# Patient Record
Sex: FEMALE | Race: WHITE | Employment: UNEMPLOYED | URBAN - METROPOLITAN AREA
[De-identification: names, ages, dates, MRNs, and addresses within clinical notes are randomized per-mention and may not be internally consistent; named-entity substitution may affect disease eponyms.]

---

## 2020-02-14 ENCOUNTER — HOSPITAL ENCOUNTER (OUTPATIENT)
Dept: PHYSICAL THERAPY | Age: 35
Setting detail: THERAPIES SERIES
Discharge: HOME | End: 2020-02-14
Payer: COMMERCIAL

## 2020-02-14 PROCEDURE — 97112 NEUROMUSCULAR REEDUCATION: CPT

## 2020-02-14 PROCEDURE — 97140 MANUAL THERAPY 1/> REGIONS: CPT

## 2020-02-14 PROCEDURE — 97110 THERAPEUTIC EXERCISES: CPT

## 2020-02-14 PROCEDURE — 97161 PT EVAL LOW COMPLEX 20 MIN: CPT

## 2020-02-14 ASSESSMENT — PAIN DESCRIPTION - FREQUENCY: FREQUENCY: CONTINUOUS

## 2020-02-14 ASSESSMENT — PAIN SCALES - GENERAL: PAINLEVEL_OUTOF10: 1

## 2020-02-14 ASSESSMENT — PAIN DESCRIPTION - PAIN TYPE: TYPE: ACUTE PAIN

## 2020-02-14 ASSESSMENT — PAIN DESCRIPTION - LOCATION: LOCATION: PERINEUM

## 2020-02-14 NOTE — PROGRESS NOTES
[address] [address], [address] [place], [place] [address]  Phone: [phone]   Fax: [phone]                                                       Physical Therapy Certification    Dear Referring Practitioner: Dr. Cece Mcclure,    We had the pleasure of evaluating the following patient for physical therapy services at [address] [place] [address]. A summary of our findings can be found in the initial assessment below. This includes our plan of care. If you have any questions or concerns regarding these findings, please do not hesitate to contact me at the office phone number checked above. Thank you for the referral.       Physician Signature:_______________________________Date:__________________  By signing above (or electronic signature), therapists plan is approved by physician      Physical Therapy  Initial Assessment  Date: 2020  Patient Name: Diandra Dominguez  MRN: [id number]  : 1985     Treatment Diagnosis: decreased PF strength and coordination, high resting tone in PF, sacral and pelvic malalignment, adductor tightness    Restrictions       Subjective   General  Chart Reviewed: Yes  Family / Caregiver Present: No  Referring Practitioner: Dr. Cece Mcclure  Referral Date : 20  Diagnosis: levator ani spasm  General Comment  Comments: Also has low back pain most of the time. PT Visit Information  Onset Date: 19  PT Insurance Information: [employer] (60 v per year)  Subjective  Subjective: Pt reports she does not have incontinence but does have stool urgency but it's due to her medications/new developments in GI tract. She has been more diligent with going to bathroom. Has been having GI problems since . Bladder joined in at August. Has bloating and pain with urination. MDs think GI and painful urination are related.  May have a tension disease (MALS) but is Rotation: 5/5  L Knee Flexion: 5/5  L Knee Extension: 5/5  Strength Other  Other: pt able to complete sit up without UE support         Ortho Screen  Posture: good   Pelvic Alignment: R outflare  Muscle Spasm: none  Ortho Screen: extremely tight adductors B  Abdominals  Scarring: none   Diatasis: none  Functional Stability: good  Abdominals: strong  Pelvic Floor  Introitus: normal   Perineal Descent: minimal  Skin Condition: good  Excursion: minimal  External Clock: normal, nonpainful   Scarring: none  Prolapse Test: n/a  Internal Clock: TTP at 9 o'clock and 2 o'clock  Sensation: diminished after vaginal cancal - could not feel OI release   Vaginal Vault: normal, nonpainful  Muscle Bulk: normal  Muscle Power: 2  Muscle Endurance (Seconds): 4 Seconds  Number Reps to Fatigue: 1  Muscle Flicks: 6  Quality of Contraction: difficult for patient to differential abdominal squeeze and PF contraction, poor coordination   Coordination: poor, myra when she was tryingt o relax and difficulty with relaxation actively  Pelvic Floor External Observations  Voluntary Contraction: Present  Involuntary Contraction: Present  Perineal Descent: Rest: Absent  Perineal Descent Bearing Down: Absent                   Assessment   Conditions Requiring Skilled Therapeutic Intervention  Body structures, Functions, Activity limitations: Decreased functional mobility ; Decreased strength;Decreased endurance;Decreased sensation;Decreased coordination; Increased pain  Assessment: Pt is a 37 y/o female who complains of pelvic pain and tightness. She has hx of narcolepsy and GI issues. She demos increased tension in PF resting tone, however is weak and uncoordinated with contraction. Pt also demos tightness in adductors B and is unabel to separate PF muscles from abdominals. She also presented with sacral and pelvic malalignment.  Pt would benefit from skilled therapy to address deficits, imrpove baldder function, reduce pain and increase strength in pelvic floor. Treatment Diagnosis: decreased PF strength and coordination, high resting tone in PF, sacral and pelvic malalignment, adductor tightness  Prognosis: Good  Decision Making: Low Complexity  REQUIRES PT FOLLOW UP: Yes         Plan   Plan  Times per week: 1x/week for 6 weeks  Current Treatment Recommendations: Strengthening, ROM, Functional Mobility Training, Manual Therapy - Soft Tissue Mobilization, Neuromuscular Re-education, Manual Therapy - Joint Manipulation    OutComes Score     POPDI-6 Score : 25 (02/14/20 1646)   CRADI-8 Score : 18.75 (02/14/20 1646) [place]-6 Score : 41.67 (02/14/20 1646)   PFDI-20 Score: 85.42 (02/14/20 1646)           Goals  Long term goals  Time Frame for Long term goals : 6 weeks  Long term goal 1: Pt will be I with HEP to improve activation and control of pelvic floor. Long term goal 2: Pt will demo ability to contract, relax, and buldge to demo improved coordination of PF muscles. Long term goal 3: Pt will increase PERF powere score to at least 4 to demo imrpoved strength in order to help control bladder and bowels. Long term goal 4: Pt will demo ability to perform functional movements with proper contraction of PF muscles.         Cristina Dean, PT, DPT [id number]

## 2020-02-14 NOTE — FLOWSHEET NOTE
Pt. Education:  2/14: Issued HO and discussed bladder irritants, stressed importance of yoga or meditation to relax, encouraged to to set an alarm on her phone to remind her to relax her muscles throughout the day, patient educated on diagnosis, prognosis and expectations for rehab, all patient questions were answered    HEP instruction:  2/14: patient provided with written and illustrated instructions for HEP (see scanned image in ) - adductor stretch, hip abd with band, PF contract with relax     Therapeutic Exercise and NMR EXR  [x] (37383) Provided verbal/tactile cueing for activities related to strengthening, flexibility, endurance, ROM for improvements in  [x] LE / Lumbar: LE, proximal hip, and core control with self care, mobility, lifting, ambulation, pelvic floor  [] UE / Cervical: cervical, postural, scapular, scapulothoracic and UE control with self care, reaching, carrying, lifting, house/yardwork, driving, computer work. [x] (96873) Provided verbal/tactile cueing for activities related to improving balance, coordination, kinesthetic sense, posture, motor skill, proprioception to assist with   [x] LE / lumbar: LE, proximal hip, and core control in self care, mobility, lifting, ambulation and eccentric single leg control, pelvic floor   [] UE / cervical: cervical, scapular, scapulothoracic and UE control with self care, reaching, carrying, lifting, house/yardwork, driving, computer work.   [] (64260) Therapist is in constant attendance of 2 or more patients providing skilled therapy interventions, but not providing any significant amount of measurable one-on-one time to either patient, for improvements in  [] LE / lumbar: LE, proximal hip, and core control in self care, mobility, lifting, ambulation and eccentric single leg control.    [] UE / cervical: cervical, scapular, scapulothoracic and UE control with self care, reaching, carrying, lifting, house/yardwork, carrying, lifting, house/yardwork, driving, computer work    Manual Treatments:  PROM / STM / Oscillations-Mobs:  G-I, II, III, IV (PA's, Inf., Post.)  [x] (58140) Provided manual therapy to mobilize LE, proximal hip and/or LS spine soft tissue/joints for the purpose of modulating pain, promoting relaxation,  increasing ROM, reducing/eliminating soft tissue swelling/inflammation/restriction, improving soft tissue extensibility and allowing for proper ROM for normal function with   [x] LE / lumbar: self care, mobility, lifting and ambulation, pelvic floor    [] UE / Cervical: self care, reaching, carrying, lifting, house/yardwork, driving, computer work. Modalities:  [] (29543) Vasopneumatic compression: Utilized vasopneumatic compression to decrease edema / swelling for the purpose of improving mobility and quad tone / recruitment which will allow for increased overall function including but not limited to self-care, transfers, ambulation, and ascending / descending stairs. Modalities:      Charges:  Timed Code Treatment Minutes: 70   Total Treatment Minutes: 85     [x] EVAL - LOW (46624)   [] EVAL - MOD (82136)  [] EVAL - HIGH (15830)  [] RE-EVAL (22799)  [x] YD(52363) x  2     [] Ionto  [x] NMR (37681) x 1      [] Vaso  [x] Manual (39479) x 1      [] Ultrasound  [] TA x        [] Mech Traction (67509)  [] Aquatic Therapy x     [] ES (un) (28509):   [] Home Management Training x  [] ES(attended) (74267)   [] Dry Needling 1-2 muscles (15834):  [] Dry Needling 3+ muscles (937840  [] Group:      [] Other:     GOALS:   Long term goals  Time Frame for Long term goals : 6 weeks  Long term goal 1: Pt will be I with HEP to improve activation and control of pelvic floor. Long term goal 2: Pt will demo ability to contract, relax, and buldge to demo improved coordination of PF muscles.    Long term goal 3: Pt will increase PERF powere score to at least 4 to demo imrpoved strength in order to help control bladder

## 2020-03-02 ENCOUNTER — HOSPITAL ENCOUNTER (OUTPATIENT)
Dept: PHYSICAL THERAPY | Age: 35
Setting detail: THERAPIES SERIES
Discharge: HOME | End: 2020-03-02
Payer: COMMERCIAL

## 2020-03-02 NOTE — PROGRESS NOTES
Physical Therapy  Cancellation/No-show Note  Patient Name:  Eulogio Scruggs  :  1985   Date:  3/2/2020  Cancelled visits to date: 1  No-shows to date: 0    Patient status for today's appointment patient:  [x]  Cancelled 3/2  []  Rescheduled appointment  []  No-show     Reason given by patient:  []  Patient ill  []  Conflicting appointment  []  No transportation    []  Conflict with work  []  No reason given  [x]  Other:  Patient overselpt   Comments:      Phone call information:   []  Phone call made today to patient at _ time at number provided:      []  Patient answered, conversation as follows:    []  Patient did not answer, message left as follows:  [x]  Phone call not made today    Electronically signed by:  Roetta Jeans, PT, DPT

## 2020-03-09 ENCOUNTER — HOSPITAL ENCOUNTER (OUTPATIENT)
Dept: PHYSICAL THERAPY | Age: 35
Setting detail: THERAPIES SERIES
Discharge: HOME | End: 2020-03-09
Payer: COMMERCIAL

## 2020-03-09 PROCEDURE — 97140 MANUAL THERAPY 1/> REGIONS: CPT

## 2020-03-09 PROCEDURE — 97112 NEUROMUSCULAR REEDUCATION: CPT

## 2020-03-09 PROCEDURE — 97110 THERAPEUTIC EXERCISES: CPT

## 2020-03-09 NOTE — FLOWSHEET NOTE
[facility] Physical Therapy  Phone: [phone]   Fax: [phone]    Physical Therapy Daily Treatment Note  Date:  3/9/2020    Patient Name:  Kacey Torres    :  1985  MRN: [id number]  Medical/Treatment Diagnosis Information:  Diagnosis: levator ani spasm  Treatment Diagnosis: decreased PF strength and coordination, high resting tone in PF, sacral and pelvic malalignment, adductor tightness  Insurance/Certification information:  PT Insurance Information: [employer] (60 v per year)  Physician Information:  Referring Practitioner: Dr. Geovanni Carrasco of care signed (Y/N): []  Yes [x]  No Sent 2020    Date of Patient follow up with Physician: ?     Progress Report: []  Yes  [x]  No     Date Range for reporting period:  Beginning 2020  Ending    Progress report due (10 Rx/or 30 days whichever is less): 6th visit    Recertification due (POC duration/ or 90 days whichever is less): 6 weeks     Visit # Insurance Allowable Auth required? Date Range    60 []  Yes  [x]  No n/a     Latex Allergy:  [x]NO      []YES  Preferred Language for Healthcare:   [x]English       []other:      Functional Scale: POPDI-6 Score : 25    CRADI-8 Score : 18.75  [place]-6 Score : 41.67   PFDI-20 Score: 85.42    Pain level:  2-3/10     SUBJECTIVE:   Pt reports she didn't do her HEP over the last week. Has fewer apt this week so she will be able to focus on HEP this week. Did practice pelvic floor contraction and relaxing over with a partner. Having pain today across her abdomen. Has increased pain with holding urine in her bladder but it is getting better. Pain also flares up with her GI issues. Prudencio set it off last weekend. The specialist she needs to see is in [place] so she is waiting to get in with him. Just heard about a MD at [facility] that has done the surgery before.      OBJECTIVE:       RESTRICTIONS/PRECAUTIONS: narcolepsy    Exercises/Interventions: Therapeutic Exercises (58535) Resistance / level Sets/sec Reps Notes   Bridge   Bridge with tband hip abd   Orange   x10   x10    90/90 toe taps    x10 B    Adductor stretch with SOS  20 sec x2 B     Butterfly stretch   1 min  x3                                       Therapeutic Activities (60625)                                          Neuromuscular Re-ed (82703)       Biofeedback PF contract/relax 5 sec holds with full relax   x10 Supine with bolster, VCs for breathing with contraction    Biofeedback relaxation to 4 or less   X 2 mins Able to maintain 2-3, supine with bolster    Biofeedback quick flicks  With full relaxation between  2 x10 Supine, with bolster                         Manual Intervention (40947)       Assessed pelvic alignment, MET to correct R outflare and LOL sacral rot, adductor release B, internal OI release B       Assessed pelvic alignment, MET to correct L post/R ant innominates, MET to correct L inflare, MET to correct LOL sacral rot , DTM to B adductors at origin    X 23 mins                                    Pt. Education:  2/14: Issued HO and discussed bladder irritants, stressed importance of yoga or meditation to relax, encouraged to to set an alarm on her phone to remind her to relax her muscles throughout the day, patient educated on diagnosis, prognosis and expectations for rehab, all patient questions were answered    HEP instruction:  2/14: patient provided with written and illustrated instructions for HEP (see scanned image in ) - adductor stretch, hip abd with band, PF contract with relax     Therapeutic Exercise and NMR EXR  [x] (01386) Provided verbal/tactile cueing for activities related to strengthening, flexibility, endurance, ROM for improvements in  [x] LE / Lumbar: LE, proximal hip, and core control with self care, mobility, lifting, ambulation, pelvic floor  [] UE / Cervical: cervical, postural, scapular, scapulothoracic and UE control with self care, reaching, carrying, lifting, house/yardwork, driving, computer work. [x] (40117) Provided verbal/tactile cueing for activities related to improving balance, coordination, kinesthetic sense, posture, motor skill, proprioception to assist with   [x] LE / lumbar: LE, proximal hip, and core control in self care, mobility, lifting, ambulation and eccentric single leg control, pelvic floor   [] UE / cervical: cervical, scapular, scapulothoracic and UE control with self care, reaching, carrying, lifting, house/yardwork, driving, computer work.   [] (07410) Therapist is in constant attendance of 2 or more patients providing skilled therapy interventions, but not providing any significant amount of measurable one-on-one time to either patient, for improvements in  [] LE / lumbar: LE, proximal hip, and core control in self care, mobility, lifting, ambulation and eccentric single leg control. [] UE / cervical: cervical, scapular, scapulothoracic and UE control with self care, reaching, carrying, lifting, house/yardwork, driving, computer work.      NMR and Therapeutic Activities:    [] (35580 or 35987) Provided verbal/tactile cueing for activities related to improving balance, coordination, kinesthetic sense, posture, motor skill, proprioception and motor activation to allow for proper function of   [] LE: / Lumbar core, proximal hip and LE with self care and ADLs  [] UE / Cervical: cervical, postural, scapular, scapulothoracic and UE control with self care, carrying, lifting, driving, computer work.   [] (68801) Gait Re-education- Provided training and instruction to the patient for proper LE, core and proximal hip recruitment and positioning and eccentric body weight control with ambulation re-education including up and down stairs     Home Management Training / Self Care:  [] (11542) Provided self-care/home management training related to activities of daily living and compensatory training, and/or use of adaptive limited to self-care, transfers, ambulation, and ascending / descending stairs. Modalities:      Charges:  Timed Code Treatment Minutes: 61   Total Treatment Minutes: 61     [] EVAL - LOW (21749)   [] EVAL - MOD (68028)  [] EVAL - HIGH (87054)  [] RE-EVAL (26423)  [x] CK(27877) x  1     [] Ionto  [x] NMR (51262) x 1      [] Vaso  [x] Manual (14427) x 2      [] Ultrasound  [] TA x        [] Mech Traction (03883)  [] Aquatic Therapy x     [] ES (un) (74044):   [] Home Management Training x  [] ES(attended) (50967)   [] Dry Needling 1-2 muscles (09165):  [] Dry Needling 3+ muscles (987773  [] Group:      [] Other:     GOALS:   Long term goals  Time Frame for Long term goals : 6 weeks  Long term goal 1: Pt will be I with HEP to improve activation and control of pelvic floor. Long term goal 2: Pt will demo ability to contract, relax, and buldge to demo improved coordination of PF muscles. Long term goal 3: Pt will increase PERF powere score to at least 4 to demo imrpoved strength in order to help control bladder and bowels. Long term goal 4: Pt will demo ability to perform functional movements with proper contraction of PF muscles. Overall Progression Towards Functional goals/ Treatment Progress Update:  [] Patient is progressing as expected towards functional goals listed. [] Progression is slowed due to complexities/Impairments listed. [] Progression has been slowed due to co-morbidities.   [x] Plan just implemented, too soon to assess goals progression <30days   [] Goals require adjustment due to lack of progress  [] Patient is not progressing as expected and requires additional follow up with physician  [] Other    Persisting Functional Limitations/Impairments:  []Sleeping []Sitting               []Standing [x]Transfers        []Walking []Kneeling               []Stairs []Squatting / bending   [x]ADLs []Reaching  [x]Lifting  []Housework  [x]Driving []Job related tasks  []Sports/Recreation []Other:        ASSESSMENT:   Pt able to achieve appropriate contraction and relaxation of pelvic floor mm this date. Decreased rested tone also noted indicating improvement in relaxation techniques. Continues to demo increased tension in adductors, but responds well to stretching and release. Will progress LE strength and coordination as able. Treatment/Activity Tolerance:  [x] Patient able to complete tx  [] Patient limited by fatigue  [] Patient limited by pain  [] Patient limited by other medical complications  [] Other:     Prognosis: [x] Good [] Fair  [] Poor    Patient Requires Follow-up: [x] Yes  [] No    Plan for next treatment session: biofeedback, relaxation techniques, PF strength, stretching     PLAN: See eval. PT 1x / week for 6 weeks. [x] Continue per plan of care [] Alter current plan (see comments)  [] Plan of care initiated [] Hold pending MD visit [] Discharge    Electronically signed by: Amie Morrow PT, DPT    Note: If patient does not return for scheduled/ recommended follow up visits, his note will serve as a discharge from care along with most recent update on progress.

## 2020-03-16 ENCOUNTER — HOSPITAL ENCOUNTER (OUTPATIENT)
Dept: PHYSICAL THERAPY | Age: 35
Setting detail: THERAPIES SERIES
Discharge: HOME | End: 2020-03-16
Payer: COMMERCIAL

## 2020-03-23 ENCOUNTER — HOSPITAL ENCOUNTER (OUTPATIENT)
Dept: PHYSICAL THERAPY | Age: 35
Setting detail: THERAPIES SERIES
Discharge: HOME | End: 2020-03-23
Payer: COMMERCIAL

## 2020-06-22 ENCOUNTER — TELEPHONE (OUTPATIENT)
Dept: PHYSICAL THERAPY | Age: 35
End: 2020-06-22

## 2020-07-09 ENCOUNTER — HOSPITAL ENCOUNTER (OUTPATIENT)
Dept: PHYSICAL THERAPY | Age: 35
Setting detail: THERAPIES SERIES
Discharge: HOME | End: 2020-07-09
Payer: COMMERCIAL

## 2020-07-09 PROCEDURE — 97140 MANUAL THERAPY 1/> REGIONS: CPT

## 2020-07-09 PROCEDURE — 97110 THERAPEUTIC EXERCISES: CPT

## 2020-07-09 NOTE — FLOWSHEET NOTE
[facility]  Phone: [phone]   Fax: [phone]    Physical Therapy Daily Treatment Note  Date:  2020    Patient Name:  Urbano Brewster    :  1985  MRN: [id number]  Medical/Treatment Diagnosis Information:  Diagnosis: levator ani spasm  Treatment Diagnosis: decreased PF strength and coordination, high resting tone in PF, sacral and pelvic malalignment, adductor tightness  Insurance/Certification information:  PT Insurance Information: [place] PPO (60 v per year)  Physician Information:  Referring Practitioner: Dr. Malou Haynes of care signed (Y/N): []  Yes [x]  No Sent 2020    Date of Patient follow up with Physician: ?     Progress Report: []  Yes  [x]  No     Date Range for reporting period:  Beginning 2020  Ending    Progress report due (10 Rx/or 30 days whichever is less): 6th visit    Recertification due (POC duration/ or 90 days whichever is less): 6 weeks     Visit # Insurance Allowable Auth required? Date Range   3/6 60 []  Yes  [x]  No n/a     Latex Allergy:  [x]NO      []YES  Preferred Language for Healthcare:   [x]English       []other:      Functional Scale: POPDI-6 Score : 25    CRADI-8 Score : 18.75  [place]-6 Score : 41.67   PFDI-20 Score: 85.42    Pain level:  0/10     SUBJECTIVE:   Pt reports she was doing well, but then it started degrading. Her other health issues are getting worse. Doesn't think she has MALS anymore; did some research and found the MD she was talking with has had some bad results. Pelvic floor symptoms continue to be cramping as well as and pain with urination. Hasn't been exercising much since quarantine. Still having pain/pressure with intercourse. Does have a BM everyday, but she's not constipated anymore. Is taking gabapentin now. Got a celiac nerve block which helped for about 1 month. Pt has stopped wearing a bra because she is having so much pain just below her sternum.       OBJECTIVE: written and illustrated instructions for HEP (see scanned image in ) - adductor stretch, hip abd with band, PF contract with relax     Therapeutic Exercise and NMR EXR  [x] (29578) Provided verbal/tactile cueing for activities related to strengthening, flexibility, endurance, ROM for improvements in  [x] LE / Lumbar: LE, proximal hip, and core control with self care, mobility, lifting, ambulation, pelvic floor  [] UE / Cervical: cervical, postural, scapular, scapulothoracic and UE control with self care, reaching, carrying, lifting, house/yardwork, driving, computer work.  [] (31981) Provided verbal/tactile cueing for activities related to improving balance, coordination, kinesthetic sense, posture, motor skill, proprioception to assist with   [x] LE / lumbar: LE, proximal hip, and core control in self care, mobility, lifting, ambulation and eccentric single leg control, pelvic floor   [] UE / cervical: cervical, scapular, scapulothoracic and UE control with self care, reaching, carrying, lifting, house/yardwork, driving, computer work.   [] (10977) Therapist is in constant attendance of 2 or more patients providing skilled therapy interventions, but not providing any significant amount of measurable one-on-one time to either patient, for improvements in  [] LE / lumbar: LE, proximal hip, and core control in self care, mobility, lifting, ambulation and eccentric single leg control. [] UE / cervical: cervical, scapular, scapulothoracic and UE control with self care, reaching, carrying, lifting, house/yardwork, driving, computer work.      NMR and Therapeutic Activities:    [] (09003 or 92612) Provided verbal/tactile cueing for activities related to improving balance, coordination, kinesthetic sense, posture, motor skill, proprioception and motor activation to allow for proper function of   [] LE: / Lumbar core, proximal hip and LE with self care and ADLs  [] UE / Cervical: cervical, postural, scapular, scapulothoracic and UE control with self care, carrying, lifting, driving, computer work.   [] (20048) Gait Re-education- Provided training and instruction to the patient for proper LE, core and proximal hip recruitment and positioning and eccentric body weight control with ambulation re-education including up and down stairs     Home Management Training / Self Care:  [] (40976) Provided self-care/home management training related to activities of daily living and compensatory training, and/or use of adaptive equipment for improvement with: ADLs and compensatory training, meal preparation, safety procedures and instruction in use of adaptive equipment, including bathing, grooming, dressing, personal hygiene, basic household cleaning and chores.      Home Exercise Program:    [] (93480) Reviewed/Progressed HEP activities related to strengthening, flexibility, endurance, ROM of   [] LE / Lumbar: core, proximal hip and LE for functional self-care, mobility, lifting and ambulation/stair navigation, pelvic floor   [] UE / Cervical: cervical, postural, scapular, scapulothoracic and UE control with self care, reaching, carrying, lifting, house/yardwork, driving, computer work  [] (39174)Reviewed/Progressed HEP activities related to improving balance, coordination, kinesthetic sense, posture, motor skill, proprioception of   [] LE: core, proximal hip and LE for self care, mobility, lifting, and ambulation/stair navigation    [] UE / Cervical: cervical, postural,  scapular, scapulothoracic and UE control with self care, reaching, carrying, lifting, house/yardwork, driving, computer work    Manual Treatments:  PROM / STM / Oscillations-Mobs:  G-I, II, III, IV (PA's, Inf., Post.)  [x] (06388) Provided manual therapy to mobilize LE, proximal hip and/or LS spine soft tissue/joints for the purpose of modulating pain, promoting relaxation,  increasing ROM, reducing/eliminating soft tissue swelling/inflammation/restriction, improving soft tissue extensibility and allowing for proper ROM for normal function with   [x] PELVIC FLOOR, LE / lumbar: self care, mobility, lifting and ambulation, pelvic floor    [] UE / Cervical: self care, reaching, carrying, lifting, house/yardwork, driving, computer work. Modalities:  [] (68204) Vasopneumatic compression: Utilized vasopneumatic compression to decrease edema / swelling for the purpose of improving mobility and quad tone / recruitment which will allow for increased overall function including but not limited to self-care, transfers, ambulation, and ascending / descending stairs. Modalities:      Charges:  Timed Code Treatment Minutes: 60   Total Treatment Minutes: 60     [] EVAL - LOW (57193)   [] EVAL - MOD (85951)  [] EVAL - HIGH (32625)  [] RE-EVAL (61528)  [x] RZ(69265) x  2     [] Ionto  [] NMR (92339) x       [] Vaso  [x] Manual (97556) x 2      [] Ultrasound  [] TA x        [] Mech Traction (47920)  [] Aquatic Therapy x     [] ES (un) (88494):   [] Home Management Training x  [] ES(attended) (45189)   [] Dry Needling 1-2 muscles (55907):  [] Dry Needling 3+ muscles (039427  [] Group:      [] Other:     GOALS:   Long term goals  Time Frame for Long term goals : 6 weeks  Long term goal 1: Pt will be I with HEP to improve activation and control of pelvic floor. Long term goal 2: Pt will demo ability to contract, relax, and buldge to demo improved coordination of PF muscles. Long term goal 3: Pt will increase PERF powere score to at least 4 to demo imrpoved strength in order to help control bladder and bowels. Long term goal 4: Pt will demo ability to perform functional movements with proper contraction of PF muscles. Overall Progression Towards Functional goals/ Treatment Progress Update:  [] Patient is progressing as expected towards functional goals listed. [] Progression is slowed due to complexities/Impairments listed.   [] Progression has been slowed due to co-morbidities. [x] Plan just implemented, too soon to assess goals progression <30days   [] Goals require adjustment due to lack of progress  [] Patient is not progressing as expected and requires additional follow up with physician  [] Other    Persisting Functional Limitations/Impairments:  []Sleeping []Sitting               []Standing [x]Transfers        []Walking []Kneeling               []Stairs []Squatting / bending   [x]ADLs []Reaching  [x]Lifting  []Housework  [x]Driving []Job related tasks  []Sports/Recreation []Other:        ASSESSMENT:   Pt able to control contract and relax of pelvic floor with much better coordination this visit. Decrease in contraction hold as seen in first visit, however able to complete quick flicks with full relaxation between. Continues to have pressure and pain feeling in abdominals and pelvic floor. Adhesions palpable in B adductors at origin. Pt would benefit from continued adductor stretching, STM, and internal release to pelvic floor. Treatment/Activity Tolerance:  [x] Patient able to complete tx  [] Patient limited by fatigue  [] Patient limited by pain  [] Patient limited by other medical complications  [] Other:     Prognosis: [x] Good [] Fair  [] Poor    Patient Requires Follow-up: [x] Yes  [] No    Plan for next treatment session: biofeedback, relaxation techniques, PF strength, stretching     PLAN: See juan c PT 1x / week for 6 weeks. [x] Continue per plan of care [] Alter current plan (see comments)  [] Plan of care initiated [] Hold pending MD visit [] Discharge    Electronically signed by: Esthela Amaya PT, DPT    Note: If patient does not return for scheduled/ recommended follow up visits, his note will serve as a discharge from care along with most recent update on progress.

## 2020-07-14 ENCOUNTER — HOSPITAL ENCOUNTER (OUTPATIENT)
Dept: PHYSICAL THERAPY | Age: 35
Setting detail: THERAPIES SERIES
Discharge: HOME | End: 2020-07-14
Payer: COMMERCIAL

## 2020-07-14 PROCEDURE — 97140 MANUAL THERAPY 1/> REGIONS: CPT

## 2020-07-14 PROCEDURE — 97110 THERAPEUTIC EXERCISES: CPT

## 2020-07-14 PROCEDURE — 97112 NEUROMUSCULAR REEDUCATION: CPT

## 2020-07-14 NOTE — FLOWSHEET NOTE
[facility]   B leg lowering from 90 deg   1  1 x15  x15                         Therapeutic Activities (97702)                                          Neuromuscular Re-ed (66695)       Biofeedback PF contract/relax 5 sec holds with full relax  2 x10 Supine with bolster, VCs for breathing with contraction - able to hold for 5 sec x 1 but usually 3 sec holds    Biofeedback relaxation to 4 or less Able to maintain 2-3, supine with bolster    Biofeedback quick flicks  With full relaxation between  2 x10 Supine, with bolster    Mindful relaxation    2 min total Able to decrease to 2-3                 Manual Intervention (79112)       Assessed pelvic alignment, MET to correct R outflare and LOL sacral rot, adductor release B, internal OI release B       Assessed pelvic alignment, MET to correct L post/R ant innominates, MET to correct L inflare, MET to correct LOL sacral rot , DTM to B adductors at origin        Assessed pelvic alignment , L leg distraction to correct L upslip, internal assessment of PF, adductor release externally B, release to internal clock at 2 and 3 o'clock       PERF score 7/9: 2/1/5//10   adductor release B and manual stretching    X 15 min                       Pt. Education:  7/9:  Discussed current health issues and reviewed what she has been doing since last visit. Pt continues to be anxious and is frustrated that her health issues are still undiagnosed.     2/14: Issued HO and discussed bladder irritants, stressed importance of yoga or meditation to relax, encouraged to to set an alarm on her phone to remind her to relax her muscles throughout the day, patient educated on diagnosis, prognosis and expectations for rehab, all patient questions were answered    HEP instruction:  7/9: issued new  HO of adductor stretch in standing, adductor stretch with strap, and butterfly stretch   2/14: patient provided with written and illustrated instructions for HEP (see scanned image in media manager) - adductor stretch, hip abd with band, PF contract with relax     Therapeutic Exercise and NMR EXR  [x] (35781) Provided verbal/tactile cueing for activities related to strengthening, flexibility, endurance, ROM for improvements in  [x] LE / Lumbar: LE, proximal hip, and core control with self care, mobility, lifting, ambulation, pelvic floor  [] UE / Cervical: cervical, postural, scapular, scapulothoracic and UE control with self care, reaching, carrying, lifting, house/yardwork, driving, computer work.  [] (77291) Provided verbal/tactile cueing for activities related to improving balance, coordination, kinesthetic sense, posture, motor skill, proprioception to assist with   [x] LE / lumbar: LE, proximal hip, and core control in self care, mobility, lifting, ambulation and eccentric single leg control, pelvic floor   [] UE / cervical: cervical, scapular, scapulothoracic and UE control with self care, reaching, carrying, lifting, house/yardwork, driving, computer work.   [] (42809) Therapist is in constant attendance of 2 or more patients providing skilled therapy interventions, but not providing any significant amount of measurable one-on-one time to either patient, for improvements in  [] LE / lumbar: LE, proximal hip, and core control in self care, mobility, lifting, ambulation and eccentric single leg control. [] UE / cervical: cervical, scapular, scapulothoracic and UE control with self care, reaching, carrying, lifting, house/yardwork, driving, computer work.      NMR and Therapeutic Activities:    [x] (15031 or 08419) Provided verbal/tactile cueing for activities related to improving balance, coordination, kinesthetic sense, posture, motor skill, proprioception and motor activation to allow for proper function of   [x] LE: / Lumbar core, proximal hip and LE with self care and ADLs  [] UE / Cervical: cervical, postural, scapular, scapulothoracic and UE control with self care, carrying, lifting, function with   [x] PELVIC FLOOR, LE / lumbar: self care, mobility, lifting and ambulation, pelvic floor    [] UE / Cervical: self care, reaching, carrying, lifting, house/yardwork, driving, computer work. Modalities:  [] (91461) Vasopneumatic compression: Utilized vasopneumatic compression to decrease edema / swelling for the purpose of improving mobility and quad tone / recruitment which will allow for increased overall function including but not limited to self-care, transfers, ambulation, and ascending / descending stairs. Modalities:      Charges:  Timed Code Treatment Minutes: 50   Total Treatment Minutes: 50     [] EVAL - LOW (38478)   [] EVAL - MOD (64192)  [] EVAL - HIGH (63241)  [] RE-EVAL (78958)  [x] BM(70816) x  1     [] Ionto  [x] NMR (27208) x 1      [] Vaso  [x] Manual (62103) x 1      [] Ultrasound  [] TA x        [] Mech Traction (72614)  [] Aquatic Therapy x                 [] ES (un) (55334):   [] Home Management Training x  [] ES(attended) (25544)   [] Dry Needling 1-2 muscles (39770):  [] Dry Needling 3+ muscles (130470  [] Group:      [] Other:     GOALS:   Long term goals  Time Frame for Long term goals : 6 weeks  Long term goal 1: Pt will be I with HEP to improve activation and control of pelvic floor. Long term goal 2: Pt will demo ability to contract, relax, and buldge to demo improved coordination of PF muscles. Long term goal 3: Pt will increase PERF powere score to at least 4 to demo imrpoved strength in order to help control bladder and bowels. Long term goal 4: Pt will demo ability to perform functional movements with proper contraction of PF muscles. Overall Progression Towards Functional goals/ Treatment Progress Update:  [] Patient is progressing as expected towards functional goals listed. [] Progression is slowed due to complexities/Impairments listed. [] Progression has been slowed due to co-morbidities.   [x] Plan just implemented, too soon to assess goals progression <30days   [] Goals require adjustment due to lack of progress  [] Patient is not progressing as expected and requires additional follow up with physician  [] Other    Persisting Functional Limitations/Impairments:  []Sleeping []Sitting               []Standing [x]Transfers        []Walking []Kneeling               []Stairs []Squatting / bending   [x]ADLs []Reaching  [x]Lifting  []Housework  [x]Driving []Job related tasks  []Sports/Recreation []Other:        ASSESSMENT:   Pt continues to present with very tight adductors B, does improve with manual release. Control of PF contract and relax improved this date per biofeedback machine. Able to reach 2-3 during relaxation and hold contraction for 3-5 sec. Plan to continue to progress core strength, increase length of adductors, and strength, coordination, and control of PF. Treatment/Activity Tolerance:  [x] Patient able to complete tx  [] Patient limited by fatigue  [] Patient limited by pain  [] Patient limited by other medical complications  [] Other:     Prognosis: [x] Good [] Fair  [] Poor    Patient Requires Follow-up: [x] Yes  [] No    Plan for next treatment session: biofeedback, relaxation techniques, PF strength, stretching     PLAN: See kin. PT 1x / week for 6 weeks. [x] Continue per plan of care [] Alter current plan (see comments)  [] Plan of care initiated [] Hold pending MD visit [] Discharge    Electronically signed by: Darwin Singh PT, DPT    Note: If patient does not return for scheduled/ recommended follow up visits, his note will serve as a discharge from care along with most recent update on progress.

## 2020-07-21 ENCOUNTER — HOSPITAL ENCOUNTER (OUTPATIENT)
Dept: PHYSICAL THERAPY | Age: 35
Setting detail: THERAPIES SERIES
Discharge: HOME | End: 2020-07-21
Payer: COMMERCIAL

## 2020-07-21 PROCEDURE — 97110 THERAPEUTIC EXERCISES: CPT

## 2020-07-21 NOTE — FLOWSHEET NOTE
[address] [facility] Physical Therapy  Phone: [phone]   Fax: [phone]    Physical Therapy Daily Treatment Note  Date:  2020    Patient Name:  Goble Babinski    :  1985  MRN: [id number]  Medical/Treatment Diagnosis Information:  Diagnosis: levator ani spasm  Treatment Diagnosis: decreased PF strength and coordination, high resting tone in PF, sacral and pelvic malalignment, adductor tightness  Insurance/Certification information:  PT Insurance Information: [employer] PPO (60 v per year)  Physician Information:  Referring Practitioner: Dr. Jose Luna of care signed (Y/N): []  Yes [x]  No Sent 2020    Date of Patient follow up with Physician: ?     Progress Report: []  Yes  [x]  No     Date Range for reporting period:  Beginning 2020  Ending    Progress report due (10 Rx/or 30 days whichever is less): 6th visit    Recertification due (POC duration/ or 90 days whichever is less): 6 weeks     Visit # Insurance Allowable Auth required? Date Range    60 []  Yes  [x]  No n/a     Latex Allergy:  [x]NO      []YES  Preferred Language for Healthcare:   [x]English       []other:      Functional Scale: POPDI-6 Score : 25    CRADI-8 Score : 18.75  [place]-6 Score : 41.67   PFDI-20 Score: 85.42    Pain level:  0/10     SUBJECTIVE:   Pt reports she is doing really well today. Still has pain with urination, but bowel movements have been consistent. Did some yard work today with a friend. Was also have to eat breakfast and lunch today.     OBJECTIVE:   : pt 11 minutes late     RESTRICTIONS/PRECAUTIONS: narcolepsy    Exercises/Interventions:     Therapeutic Exercises (44143) Resistance / level Sets/sec Reps Notes   Bridge   Bridge with tband hip abd Orange x15   90/90 toe taps     Adductor and HS stretch with SOS    Butterfly stretch      Standing lateral lunge stretch for adductors     [facility]   B leg lowering from 90 deg      Bike #3   5 min [facility]  Leg press  Leg ext  Ham curl  Hip abd  Hip add    TRX squats  LPD     60#  10#  20#  25#  20#      20#   1  1  1  1  1    1  1   10  9  8  11  10    10  5           Therapeutic Activities (04750)                                          Neuromuscular Re-ed (49324)       Biofeedback PF contract/relax Supine with bolster, VCs for breathing with contraction - able to hold for 5 sec x 1 but usually 3 sec holds    Biofeedback relaxation to 4 or less Able to maintain 2-3, supine with bolster    Biofeedback quick flicks  Supine, with bolster    Mindful relaxation  Able to decrease to 2-3           Manual Intervention (30943)    Assessed pelvic alignment, MET to correct R outflare and LOL sacral rot, adductor release B, internal OI release B    Assessed pelvic alignment, MET to correct L post/R ant innominates, MET to correct L inflare, MET to correct LOL sacral rot , DTM to B adductors at origin     Assessed pelvic alignment , L leg distraction to correct L upslip, internal assessment of PF, adductor release externally B, release to internal clock at 2 and 3 o'clock   PERF score 7/9: 2/1/5//10   adductor release B and manual stretching                    Pt. Education:  7/21: [facility] and educated on activities she can complete post DC  7/9:  Discussed current health issues and reviewed what she has been doing since last visit. Pt continues to be anxious and is frustrated that her health issues are still undiagnosed.     2/14: Issued HO and discussed bladder irritants, stressed importance of yoga or meditation to relax, encouraged to to set an alarm on her phone to remind her to relax her muscles throughout the day, patient educated on diagnosis, prognosis and expectations for rehab, all patient questions were answered    HEP instruction:  7/9: issued new  HO of adductor stretch in standing, adductor stretch with strap, and butterfly stretch   2/14: patient provided with written and illustrated instructions for HEP (see scanned image in ) - adductor stretch, hip abd with band, PF contract with relax     Therapeutic Exercise and NMR EXR  [x] (75082) Provided verbal/tactile cueing for activities related to strengthening, flexibility, endurance, ROM for improvements in  [x] LE / Lumbar: LE, proximal hip, and core control with self care, mobility, lifting, ambulation, pelvic floor  [] UE / Cervical: cervical, postural, scapular, scapulothoracic and UE control with self care, reaching, carrying, lifting, house/yardwork, driving, computer work.  [] (41982) Provided verbal/tactile cueing for activities related to improving balance, coordination, kinesthetic sense, posture, motor skill, proprioception to assist with   [x] LE / lumbar: LE, proximal hip, and core control in self care, mobility, lifting, ambulation and eccentric single leg control, pelvic floor   [] UE / cervical: cervical, scapular, scapulothoracic and UE control with self care, reaching, carrying, lifting, house/yardwork, driving, computer work.   [] (01713) Therapist is in constant attendance of 2 or more patients providing skilled therapy interventions, but not providing any significant amount of measurable one-on-one time to either patient, for improvements in  [] LE / lumbar: LE, proximal hip, and core control in self care, mobility, lifting, ambulation and eccentric single leg control. [] UE / cervical: cervical, scapular, scapulothoracic and UE control with self care, reaching, carrying, lifting, house/yardwork, driving, computer work.      NMR and Therapeutic Activities:    [] (43651 or 77107) Provided verbal/tactile cueing for activities related to improving balance, coordination, kinesthetic sense, posture, motor skill, proprioception and motor activation to allow for proper function of   [] LE: / Lumbar core, proximal hip and LE with self care and ADLs  [] UE / Cervical: cervical, postural, scapular, scapulothoracic and UE control with self care, carrying, lifting, driving, computer work.   [] (01182) Gait Re-education- Provided training and instruction to the patient for proper LE, core and proximal hip recruitment and positioning and eccentric body weight control with ambulation re-education including up and down stairs     Home Management Training / Self Care:  [] (55499) Provided self-care/home management training related to activities of daily living and compensatory training, and/or use of adaptive equipment for improvement with: ADLs and compensatory training, meal preparation, safety procedures and instruction in use of adaptive equipment, including bathing, grooming, dressing, personal hygiene, basic household cleaning and chores.      Home Exercise Program:    [] (39489) Reviewed/Progressed HEP activities related to strengthening, flexibility, endurance, ROM of   [] LE / Lumbar: core, proximal hip and LE for functional self-care, mobility, lifting and ambulation/stair navigation, pelvic floor   [] UE / Cervical: cervical, postural, scapular, scapulothoracic and UE control with self care, reaching, carrying, lifting, house/yardwork, driving, computer work  [] (67698)Reviewed/Progressed HEP activities related to improving balance, coordination, kinesthetic sense, posture, motor skill, proprioception of   [] LE: core, proximal hip and LE for self care, mobility, lifting, and ambulation/stair navigation    [] UE / Cervical: cervical, postural,  scapular, scapulothoracic and UE control with self care, reaching, carrying, lifting, house/yardwork, driving, computer work    Manual Treatments:  PROM / STM / Oscillations-Mobs:  G-I, II, III, IV (PA's, Inf., Post.)  [] (76741) Provided manual therapy to mobilize LE, proximal hip and/or LS spine soft tissue/joints for the purpose of modulating pain, promoting relaxation,  increasing ROM, reducing/eliminating soft tissue swelling/inflammation/restriction, improving co-morbidities. [x] Plan just implemented, too soon to assess goals progression <30days   [] Goals require adjustment due to lack of progress  [] Patient is not progressing as expected and requires additional follow up with physician  [] Other    Persisting Functional Limitations/Impairments:  []Sleeping []Sitting               []Standing [x]Transfers        []Walking []Kneeling               []Stairs []Squatting / bending   [x]ADLs []Reaching  [x]Lifting  []Housework  [x]Driving []Job related tasks  []Sports/Recreation []Other:        ASSESSMENT:   Pt responded moderately well to exercise equipment. Limited reps this date due to activities earlier in the day (yard work). Plan to monitor response at next visits and DC to independent HEP. Treatment/Activity Tolerance:  [x] Patient able to complete tx  [] Patient limited by fatigue  [] Patient limited by pain  [] Patient limited by other medical complications  [] Other:     Prognosis: [x] Good [] Fair  [] Poor    Patient Requires Follow-up: [x] Yes  [] No    Plan for next treatment session: biofeedback, relaxation techniques, PF strength, stretching     PLAN: See eval. PT 1x / week for 6 weeks. [x] Continue per plan of care [] Alter current plan (see comments)  [] Plan of care initiated [] Hold pending MD visit [] Discharge    Electronically signed by: Leticia León PT, DPT    Note: If patient does not return for scheduled/ recommended follow up visits, his note will serve as a discharge from care along with most recent update on progress.

## 2020-07-28 ENCOUNTER — HOSPITAL ENCOUNTER (OUTPATIENT)
Dept: PHYSICAL THERAPY | Age: 35
Setting detail: THERAPIES SERIES
Discharge: HOME | End: 2020-07-28
Payer: COMMERCIAL

## 2020-07-28 PROCEDURE — 97110 THERAPEUTIC EXERCISES: CPT

## 2020-07-28 NOTE — DISCHARGE SUMMARY
[address] [facility] Physical Therapy  Phone: [phone]   Fax: [phone]   Physical Therapy Discharge Summary    Dear Dr. eDlmer Pompa,    We had the pleasure of treating the following patient for physical therapy services at [address] [place] [address]. A summary of our findings can be found in the discharge summary below. If you have any questions or concerns regarding these findings, please do not hesitate to contact me at the office phone number checked above. Thank you for the referral.     Physician Signature:________________________________Date:__________________  By signing above (or electronic signature), therapists plan is approved by physician      Functional Outcome: POPDI-6 Score : 12.5    CRADI-8 Score : 0  [place]-6 Score : 12.5  PFDI-20 Score: 25    Overall Response to Treatment:   [x]Patient is responding well to treatment and improvement is noted with regards  to goals   []Patient should continue to improve in reasonable time if they continue HEP   []Patient has plateaued and is no longer responding to skilled PT intervention    []Patient is getting worse and would benefit from return to referring MD   []Patient unable to adhere to initial POC   []Other:    Date range of Visits: 2020 - 2020  Total Visits: 6    Recommendation:     [x] Discharge to [facility]. Follow up with MD PRN.      Physical Therapy Daily Treatment Note  Date:  2020    Patient Name:  Tenisha Hunt    :  1985  MRN: [id number]  Medical/Treatment Diagnosis Information:  Diagnosis: levator ani spasm  Treatment Diagnosis: decreased PF strength and coordination, high resting tone in PF, sacral and pelvic malalignment, adductor tightness  Insurance/Certification information:  PT Insurance Information: [employer] PPO (60 v per year)  Physician Information:  Referring Practitioner: Dr. Malou Haynes of care signed (Y/N): []  Yes [x]  No Sent 2020    Date of Patient follow up with Physician: ?     Progress Report: [x]  Yes  []  No     Date Range for reporting period:  Beginning 7/9/2020  Ending 7/28/2020    Progress report due (10 Rx/or 30 days whichever is less): 6th visit    Recertification due (POC duration/ or 90 days whichever is less): 6 weeks     Visit # Insurance Allowable Auth required? Date Range   6/6 60 []  Yes  [x]  No n/a     Latex Allergy:  [x]NO      []YES  Preferred Language for Healthcare:   [x]English       []other:      Functional Scale: POPDI-6 Score : 12.5    CRADI-8 Score : 0  [place]-6 Score : 12.5  PFDI-20 Score: 25    Pain level:  0/10     SUBJECTIVE:   Pt reports she is having some pain in her pelvic area as well as her chest. Feels like her symptoms are flaring up again. Has been taking gabapentin and it has helped with her abdominal pain quite a bit. Overall feels she has better control of her pelvic floor and less pain. Still having nausea, but is listening to her body and will take a pill if needed.       OBJECTIVE:   7/28: unable to test PERF score as patient is on menstrual cycle so did not want to complete intern testing; last PERF score 2/1/5//10    7/9: pt 11 minutes late     RESTRICTIONS/PRECAUTIONS: narcolepsy    Exercises/Interventions:     Therapeutic Exercises (45279) Resistance / level Sets/sec Reps Notes   Bridge   Bridge with tband hip abd Orange x15   90/90 toe taps     Adductor and HS stretch with SOS    Butterfly stretch      Standing lateral lunge stretch for adductors     [facility]   B leg lowering from 90 deg      Bike #3       [facility]  Leg press  Leg ext  Ham curl  Hip abd  Hip add    TRX squats  TRX lateral weight shift  LPD    nustep     60#  10#  20#             1  1  1        1     5  5  3        10      X 1 min           Therapeutic Activities (02155)                                          Neuromuscular Re-ed (96235)       Biofeedback PF contract/relax Supine with bolster, VCs for breathing with contraction - able to hold for 5 sec x 1 but usually 3 sec holds    Biofeedback relaxation to 4 or less Able to maintain 2-3, supine with bolster    Biofeedback quick flicks  Supine, with bolster    Mindful relaxation  Able to decrease to 2-3           Manual Intervention (40193)    Assessed pelvic alignment, MET to correct R outflare and LOL sacral rot, adductor release B, internal OI release B    Assessed pelvic alignment, MET to correct L post/R ant innominates, MET to correct L inflare, MET to correct LOL sacral rot , DTM to B adductors at origin     Assessed pelvic alignment , L leg distraction to correct L upslip, internal assessment of PF, adductor release externally B, release to internal clock at 2 and 3 o'clock   PERF score 7/9: 2/1/5//10   adductor release B and manual stretching                    Pt. Education:  7/28: Reassessed goals, discussed progress made and solidified HEP, issued [employer] pas and educated on use, answered all remaining questions, encouraged pt to use pool in [employer]     7/21: gave pt a tour of the [employer] and educated on activities she can complete post DC  7/9:  Discussed current health issues and reviewed what she has been doing since last visit. Pt continues to be anxious and is frustrated that her health issues are still undiagnosed.     2/14: Issued HO and discussed bladder irritants, stressed importance of yoga or meditation to relax, encouraged to to set an alarm on her phone to remind her to relax her muscles throughout the day, patient educated on diagnosis, prognosis and expectations for rehab, all patient questions were answered    HEP instruction:  7/28: issued HO of [employer] machines for LEs and tailored to pt's size and strength   7/9: issued new  HO of adductor stretch in standing, adductor stretch with strap, and butterfly stretch   2/14: patient provided with written and illustrated instructions for HEP (see scanned image in ) - adductor stretch, hip abd with band, PF contract with relax     Therapeutic Exercise and NMR EXR  [x] (38365) Provided verbal/tactile cueing for activities related to strengthening, flexibility, endurance, ROM for improvements in  [x] LE / Lumbar: LE, proximal hip, and core control with self care, mobility, lifting, ambulation, pelvic floor  [] UE / Cervical: cervical, postural, scapular, scapulothoracic and UE control with self care, reaching, carrying, lifting, house/yardwork, driving, computer work.  [] (82750) Provided verbal/tactile cueing for activities related to improving balance, coordination, kinesthetic sense, posture, motor skill, proprioception to assist with   [x] LE / lumbar: LE, proximal hip, and core control in self care, mobility, lifting, ambulation and eccentric single leg control, pelvic floor   [] UE / cervical: cervical, scapular, scapulothoracic and UE control with self care, reaching, carrying, lifting, house/yardwork, driving, computer work.   [] (61451) Therapist is in constant attendance of 2 or more patients providing skilled therapy interventions, but not providing any significant amount of measurable one-on-one time to either patient, for improvements in  [] LE / lumbar: LE, proximal hip, and core control in self care, mobility, lifting, ambulation and eccentric single leg control. [] UE / cervical: cervical, scapular, scapulothoracic and UE control with self care, reaching, carrying, lifting, house/yardwork, driving, computer work.      NMR and Therapeutic Activities:    [] (08133 or 15550) Provided verbal/tactile cueing for activities related to improving balance, coordination, kinesthetic sense, posture, motor skill, proprioception and motor activation to allow for proper function of   [] LE: / Lumbar core, proximal hip and LE with self care and ADLs  [] UE / Cervical: cervical, postural, scapular, scapulothoracic and UE control with self care, carrying, lifting, driving, computer work.   [] (57906) Gait Re-education- Provided training and instruction to the patient for proper LE, core and proximal hip recruitment and positioning and eccentric body weight control with ambulation re-education including up and down stairs     Home Management Training / Self Care:  [] (15346) Provided self-care/home management training related to activities of daily living and compensatory training, and/or use of adaptive equipment for improvement with: ADLs and compensatory training, meal preparation, safety procedures and instruction in use of adaptive equipment, including bathing, grooming, dressing, personal hygiene, basic household cleaning and chores.      Home Exercise Program:    [x] (05280) Reviewed/Progressed HEP activities related to strengthening, flexibility, endurance, ROM of   [x] LE / Lumbar: core, proximal hip and LE for functional self-care, mobility, lifting and ambulation/stair navigation, pelvic floor   [] UE / Cervical: cervical, postural, scapular, scapulothoracic and UE control with self care, reaching, carrying, lifting, house/yardwork, driving, computer work  [] (37878)Reviewed/Progressed HEP activities related to improving balance, coordination, kinesthetic sense, posture, motor skill, proprioception of   [] LE: core, proximal hip and LE for self care, mobility, lifting, and ambulation/stair navigation    [] UE / Cervical: cervical, postural,  scapular, scapulothoracic and UE control with self care, reaching, carrying, lifting, house/yardwork, driving, computer work    Manual Treatments:  PROM / STM / Oscillations-Mobs:  G-I, II, III, IV (PA's, Inf., Post.)  [] (67490) Provided manual therapy to mobilize LE, proximal hip and/or LS spine soft tissue/joints for the purpose of modulating pain, promoting relaxation,  increasing ROM, reducing/eliminating soft tissue swelling/inflammation/restriction, improving soft tissue extensibility and allowing for proper ROM for normal function with   [] PELVIC FLOOR, LE / lumbar: self <30days   [] Goals require adjustment due to lack of progress  [] Patient is not progressing as expected and requires additional follow up with physician  [] Other    Persisting Functional Limitations/Impairments:  []Sleeping []Sitting               []Standing [x]Transfers        []Walking []Kneeling               []Stairs []Squatting / bending   [x]ADLs []Reaching  [x]Lifting  []Housework  [x]Driving []Job related tasks  []Sports/Recreation []Other:        ASSESSMENT:   Pt is a 37 y/o female with a history of GI and abdominal issues who presented to therapy for pelvic pain and spasm. She has been seen for 6 visits as has progressed very well. She demo'd increased muscle tightness in pelvic floor as well as B adductors at origin. She also had very little control of her pelvic floor muscles at the start of therapy, which has improved to date. She is now able to relax her pelvic floor volitionally and reports less pain overall. She has been taught a comprehensive HEP focused on PF and LE strength, adductor stretching, and overall functional mobility. She will now be discharged from therapy and was advised to follow up with MD if new issues arise. Treatment/Activity Tolerance:  [x] Patient able to complete tx  [] Patient limited by fatigue  [] Patient limited by pain  [] Patient limited by other medical complications  [] Other:     Prognosis: [x] Good [] Fair  [] Poor    Patient Requires Follow-up: [] Yes  [x] No    Plan for next treatment session: biofeedback, relaxation techniques, PF strength, stretching     PLAN: See juan c PT 1x / week for 6 weeks. [] Continue per plan of care [] Alter current plan (see comments)  [] Plan of care initiated [] Hold pending MD visit [x] Discharge    Electronically signed by: Brandi Gonzalez PT, DPT    Note: If patient does not return for scheduled/ recommended follow up visits, his note will serve as a discharge from care along with most recent update on progress.

## 2020-08-17 ENCOUNTER — NURSE ONLY (OUTPATIENT)
Dept: PRIMARY CARE CLINIC | Age: 35
End: 2020-08-17
Payer: COMMERCIAL

## 2020-08-17 PROCEDURE — 99211 OFF/OP EST MAY X REQ PHY/QHP: CPT | Performed by: NURSE PRACTITIONER

## 2020-08-20 ENCOUNTER — ANESTHESIA EVENT (OUTPATIENT)
Dept: ENDOSCOPY | Age: 35
End: 2020-08-20
Payer: COMMERCIAL

## 2020-08-20 LAB — SARS-COV-2, NAA: NOT DETECTED

## 2020-08-21 ENCOUNTER — ANESTHESIA (OUTPATIENT)
Dept: ENDOSCOPY | Age: 35
End: 2020-08-21
Payer: COMMERCIAL

## 2020-08-21 ENCOUNTER — HOSPITAL ENCOUNTER (OUTPATIENT)
Age: 35
Setting detail: OUTPATIENT SURGERY
Discharge: HOME | End: 2020-08-21
Attending: INTERNAL MEDICINE | Admitting: INTERNAL MEDICINE
Payer: COMMERCIAL

## 2020-08-21 VITALS
OXYGEN SATURATION: 99 % | HEART RATE: 78 BPM | BODY MASS INDEX: 21.11 KG/M2 | TEMPERATURE: 97.3 F | WEIGHT: 111.8 LBS | RESPIRATION RATE: 15 BRPM | SYSTOLIC BLOOD PRESSURE: 98 MMHG | HEIGHT: 61 IN | DIASTOLIC BLOOD PRESSURE: 62 MMHG

## 2020-08-21 VITALS — SYSTOLIC BLOOD PRESSURE: 97 MMHG | DIASTOLIC BLOOD PRESSURE: 73 MMHG | OXYGEN SATURATION: 99 %

## 2020-08-21 LAB — PREGNANCY, URINE: NEGATIVE

## 2020-08-21 PROCEDURE — 3700000000 HC ANESTHESIA ATTENDED CARE: Performed by: INTERNAL MEDICINE

## 2020-08-21 PROCEDURE — 2500000003 HC RX 250 WO HCPCS: Performed by: INTERNAL MEDICINE

## 2020-08-21 PROCEDURE — 3700000001 HC ADD 15 MINUTES (ANESTHESIA): Performed by: INTERNAL MEDICINE

## 2020-08-21 PROCEDURE — 2580000003 HC RX 258: Performed by: ANESTHESIOLOGY

## 2020-08-21 PROCEDURE — 3609027000 HC COLONOSCOPY: Performed by: INTERNAL MEDICINE

## 2020-08-21 PROCEDURE — 7100000011 HC PHASE II RECOVERY - ADDTL 15 MIN: Performed by: INTERNAL MEDICINE

## 2020-08-21 PROCEDURE — 7100000010 HC PHASE II RECOVERY - FIRST 15 MIN: Performed by: INTERNAL MEDICINE

## 2020-08-21 PROCEDURE — 6360000002 HC RX W HCPCS: Performed by: NURSE ANESTHETIST, CERTIFIED REGISTERED

## 2020-08-21 PROCEDURE — 2709999900 HC NON-CHARGEABLE SUPPLY: Performed by: INTERNAL MEDICINE

## 2020-08-21 PROCEDURE — 3609012400 HC EGD TRANSORAL BIOPSY SINGLE/MULTIPLE: Performed by: INTERNAL MEDICINE

## 2020-08-21 PROCEDURE — 84703 CHORIONIC GONADOTROPIN ASSAY: CPT

## 2020-08-21 PROCEDURE — 6370000000 HC RX 637 (ALT 250 FOR IP): Performed by: INTERNAL MEDICINE

## 2020-08-21 PROCEDURE — 88305 TISSUE EXAM BY PATHOLOGIST: CPT

## 2020-08-21 RX ORDER — SODIUM CHLORIDE, SODIUM LACTATE, POTASSIUM CHLORIDE, CALCIUM CHLORIDE 600; 310; 30; 20 MG/100ML; MG/100ML; MG/100ML; MG/100ML
INJECTION, SOLUTION INTRAVENOUS CONTINUOUS
Status: DISCONTINUED | OUTPATIENT
Start: 2020-08-21 | End: 2020-08-21 | Stop reason: HOSPADM

## 2020-08-21 RX ORDER — BUPROPION HYDROCHLORIDE 450 MG/1
450 TABLET, FILM COATED, EXTENDED RELEASE ORAL DAILY
COMMUNITY

## 2020-08-21 RX ORDER — SODIUM CHLORIDE 0.9 % (FLUSH) 0.9 %
10 SYRINGE (ML) INJECTION PRN
Status: DISCONTINUED | OUTPATIENT
Start: 2020-08-21 | End: 2020-08-21 | Stop reason: HOSPADM

## 2020-08-21 RX ORDER — LIDOCAINE HYDROCHLORIDE 20 MG/ML
INJECTION, SOLUTION INTRAVENOUS PRN
Status: DISCONTINUED | OUTPATIENT
Start: 2020-08-21 | End: 2020-08-21 | Stop reason: SDUPTHER

## 2020-08-21 RX ORDER — UBROGEPANT 100 MG/1
1-2 TABLET ORAL DAILY PRN
COMMUNITY

## 2020-08-21 RX ORDER — PROPOFOL 10 MG/ML
INJECTION, EMULSION INTRAVENOUS PRN
Status: DISCONTINUED | OUTPATIENT
Start: 2020-08-21 | End: 2020-08-21 | Stop reason: SDUPTHER

## 2020-08-21 RX ORDER — DEXTROAMPHETAMINE SACCHARATE, AMPHETAMINE ASPARTATE, DEXTROAMPHETAMINE SULFATE AND AMPHETAMINE SULFATE 5; 5; 5; 5 MG/1; MG/1; MG/1; MG/1
20 TABLET ORAL 3 TIMES DAILY PRN
COMMUNITY

## 2020-08-21 RX ORDER — CALCIUM CARBONATE 750 MG/1
2 TABLET, CHEWABLE ORAL DAILY
COMMUNITY

## 2020-08-21 RX ORDER — ANTIARTHRITIC COMBINATION NO.2 900 MG
1 TABLET ORAL DAILY
COMMUNITY

## 2020-08-21 RX ORDER — SODIUM CITRATE 230 MG
2-4 TABLET,CHEWABLE ORAL DAILY PRN
COMMUNITY

## 2020-08-21 RX ORDER — ONDANSETRON 4 MG/1
4 TABLET, FILM COATED ORAL EVERY 8 HOURS PRN
COMMUNITY

## 2020-08-21 RX ORDER — LISDEXAMFETAMINE DIMESYLATE 60 MG/1
1 CAPSULE ORAL DAILY
COMMUNITY

## 2020-08-21 RX ORDER — ACETAMINOPHEN 160 MG
1 TABLET,DISINTEGRATING ORAL DAILY
COMMUNITY

## 2020-08-21 RX ORDER — SIMETHICONE 20 MG/.3ML
EMULSION ORAL PRN
Status: DISCONTINUED | OUTPATIENT
Start: 2020-08-21 | End: 2020-08-21 | Stop reason: ALTCHOICE

## 2020-08-21 RX ORDER — GALCANEZUMAB 120 MG/ML
INJECTION, SOLUTION SUBCUTANEOUS
COMMUNITY

## 2020-08-21 RX ORDER — MAGNESIUM OXIDE 400 MG/1
400 TABLET ORAL DAILY
COMMUNITY

## 2020-08-21 RX ORDER — SODIUM CHLORIDE 0.9 % (FLUSH) 0.9 %
10 SYRINGE (ML) INJECTION EVERY 12 HOURS SCHEDULED
Status: DISCONTINUED | OUTPATIENT
Start: 2020-08-21 | End: 2020-08-21 | Stop reason: HOSPADM

## 2020-08-21 RX ORDER — LANOLIN ALCOHOL/MO/W.PET/CERES
1000 CREAM (GRAM) TOPICAL DAILY
COMMUNITY

## 2020-08-21 RX ORDER — ONABOTULINUMTOXINA 100 [USP'U]/1
100 INJECTION, POWDER, LYOPHILIZED, FOR SOLUTION INTRADERMAL; INTRAMUSCULAR
COMMUNITY

## 2020-08-21 RX ORDER — IBUPROFEN 200 MG
200 TABLET ORAL EVERY 6 HOURS PRN
COMMUNITY

## 2020-08-21 RX ORDER — ARIPIPRAZOLE 10 MG/1
10 TABLET ORAL DAILY
COMMUNITY

## 2020-08-21 RX ORDER — LAMOTRIGINE 200 MG/1
200 TABLET ORAL DAILY
COMMUNITY

## 2020-08-21 RX ORDER — LIDOCAINE HYDROCHLORIDE 10 MG/ML
1 INJECTION, SOLUTION EPIDURAL; INFILTRATION; INTRACAUDAL; PERINEURAL
Status: DISCONTINUED | OUTPATIENT
Start: 2020-08-21 | End: 2020-08-21 | Stop reason: HOSPADM

## 2020-08-21 RX ORDER — HYDROXYZINE HYDROCHLORIDE 25 MG/1
25-50 TABLET, FILM COATED ORAL NIGHTLY PRN
COMMUNITY

## 2020-08-21 RX ADMIN — PROPOFOL 50 MG: 10 INJECTION, EMULSION INTRAVENOUS at 13:57

## 2020-08-21 RX ADMIN — PROPOFOL 50 MG: 10 INJECTION, EMULSION INTRAVENOUS at 13:54

## 2020-08-21 RX ADMIN — PROPOFOL 50 MG: 10 INJECTION, EMULSION INTRAVENOUS at 14:03

## 2020-08-21 RX ADMIN — LIDOCAINE HYDROCHLORIDE 100 MG: 20 INJECTION, SOLUTION INTRAVENOUS at 13:47

## 2020-08-21 RX ADMIN — PROPOFOL 150 MG: 10 INJECTION, EMULSION INTRAVENOUS at 13:47

## 2020-08-21 RX ADMIN — SODIUM CHLORIDE, SODIUM LACTATE, POTASSIUM CHLORIDE, AND CALCIUM CHLORIDE: 600; 310; 30; 20 INJECTION, SOLUTION INTRAVENOUS at 13:33

## 2020-08-21 RX ADMIN — PROPOFOL 50 MG: 10 INJECTION, EMULSION INTRAVENOUS at 13:51

## 2020-08-21 ASSESSMENT — PULMONARY FUNCTION TESTS
PIF_VALUE: 0
PIF_VALUE: 1
PIF_VALUE: 0
PIF_VALUE: 1
PIF_VALUE: 0
PIF_VALUE: 1
PIF_VALUE: 0
PIF_VALUE: 0
PIF_VALUE: 2
PIF_VALUE: 0
PIF_VALUE: 1

## 2020-08-21 ASSESSMENT — PAIN SCALES - WONG BAKER
WONGBAKER_NUMERICALRESPONSE: 0
WONGBAKER_NUMERICALRESPONSE: 0

## 2020-08-21 ASSESSMENT — PAIN - FUNCTIONAL ASSESSMENT: PAIN_FUNCTIONAL_ASSESSMENT: 0-10

## 2020-08-21 ASSESSMENT — PAIN SCALES - GENERAL: PAINLEVEL_OUTOF10: 0

## 2020-08-21 NOTE — H&P
1000 MCG tablet Take 1,000 mcg by mouth daily   Yes Historical Provider, MD   Cholecalciferol (VITAMIN D3) 50 MCG (2000 UT) CAPS Take 1 capsule by mouth daily   Yes Historical Provider, MD   Biotin 5000 MCG TABS Take 1 tablet by mouth daily   Yes Historical Provider, MD   Multiple Vitamins-Minerals (WOMENS MULTI) CAPS Take 1 capsule by mouth daily   Yes Historical Provider, MD   calcium carbonate (TUMS EXTRA STRENGTH 750) 750 MG chewable tablet Take 2 tablets by mouth daily   Yes Historical Provider, MD   magnesium oxide (MAG-OX) 400 MG tablet Take 400 mg by mouth daily   Yes Historical Provider, MD       Family History:  family history is not on file. Social History:   reports that she has quit smoking. She has never used smokeless tobacco. She reports current alcohol use. She reports that she does not use drugs. Allergies:  Adhesive tape and Morphine    ROS:  twelve point system review was unremarkable except for above noted history. Nurses notes reviewed and agreed. Medications reviewed    Physical Exam:  Vital Signs: /70   Pulse 92   Temp 96.8 °F (36 °C) (Temporal)   Resp 16   Ht 5' 1\" (1.549 m)   Wt 111 lb 12.8 oz (50.7 kg)   LMP 08/14/2020   SpO2 100%   BMI 21.12 kg/m²    Skin: normal  HEENT: normal  Neck: supple. No adenopathy. No thyromegaly. No JVD. Pulmonary:Normal  Cardiac:Normal  Abdomen:Normal  MS: normal  Neuro: normal  Ext: no edema. Pulses normal    Pre-Procedure Assessment / Plan:  ASA 2 - Patient with mild systemic disease with no functional limitations  Mallampati Airway Assessment:  Mallampati Class I - (soft palate, fauces, uvula & anterior/posterior tonsillar pillars are visible)  Level of Sedation Plan:Mac sedation  Post Procedure plan: Return to same level of care    I assessed the patient and find that the patient is in satisfactory condition to proceed with the planned procedure and sedation plan.     I have explained the risk, benefits, and alternatives to the procedure. The patient understands and agrees to proceed.   Yes    Starr Ervin  1:45 PM 8/21/2020

## 2020-08-21 NOTE — ANESTHESIA PRE PROCEDURE
Department of Anesthesiology  Preprocedure Note       Name:  Adryan Lambert   Age:  36 y.o.  :  1985                                          MRN:  [id number]         Date:  2020      Surgeon: Kayla Sanchez):  Clifford Andrews MD    Procedure: Procedure(s):  ESOPHAGOGASTRODUODENOSCOPY  COLONOSCOPY    Medications prior to admission:   Prior to Admission medications    Medication Sig Start Date End Date Taking? Authorizing Provider   buPROPion HCl ER, XL, 450 MG TB24 Take 450 mg by mouth daily   Yes Historical Provider, MD   lamoTRIgine (LAMICTAL) 200 MG tablet Take 200 mg by mouth daily   Yes Historical Provider, MD   ARIPiprazole (ABILIFY) 10 MG tablet Take 10 mg by mouth daily   Yes Historical Provider, MD   Lisdexamfetamine Dimesylate (VYVANSE) 60 MG CAPS Take 1 capsule by mouth daily. Yes Historical Provider, MD   Galcanezumab-gnlm (EMGALITY) 120 MG/ML SOAJ Inject into the skin every 30 days   Yes Historical Provider, MD   onabotulinumtoxin A (BOTOX) 100 units injection Inject 100 Units into the muscle every 3 months   Yes Historical Provider, MD   amphetamine-dextroamphetamine (ADDERALL) 20 MG tablet Take 20 mg by mouth 3 times daily as needed.    Yes Historical Provider, MD   ondansetron (ZOFRAN) 4 MG tablet Take 4 mg by mouth every 8 hours as needed for Nausea or Vomiting   Yes Historical Provider, MD   hydrOXYzine (ATARAX) 25 MG tablet Take 25-50 mg by mouth nightly as needed for Itching   Yes Historical Provider, MD   Ubrogepant (UBRELVY) 100 MG TABS Take 1-2 tablets by mouth daily as needed   Yes Historical Provider, MD   Dextrose-Fructose-Sod Citrate (NAUZENE) 050-431-492 MG CHEW Take 2-4 tablets by mouth daily as needed   Yes Historical Provider, MD   ibuprofen (ADVIL;MOTRIN) 200 MG tablet Take 200 mg by mouth every 6 hours as needed for Pain   Yes Historical Provider, MD   vitamin B-12 (CYANOCOBALAMIN) 1000 MCG tablet Take 1,000 mcg by mouth daily   Yes Historical Provider, MD   Cholecalciferol (VITAMIN D3) 50 MCG (2000 UT) CAPS Take 1 capsule by mouth daily   Yes Historical Provider, MD   Biotin 5000 MCG TABS Take 1 tablet by mouth daily   Yes Historical Provider, MD   Multiple Vitamins-Minerals (WOMENS MULTI) CAPS Take 1 capsule by mouth daily   Yes Historical Provider, MD   calcium carbonate (TUMS EXTRA STRENGTH 750) 750 MG chewable tablet Take 2 tablets by mouth daily   Yes Historical Provider, MD   magnesium oxide (MAG-OX) 400 MG tablet Take 400 mg by mouth daily   Yes Historical Provider, MD       Current medications:    Current Facility-Administered Medications   Medication Dose Route Frequency Provider Last Rate Last Dose    lactated ringers infusion   Intravenous Continuous Evi Stallworth MD        sodium chloride flush 0.9 % injection 10 mL  10 mL Intravenous 2 times per day Evi Stallworth MD        sodium chloride flush 0.9 % injection 10 mL  10 mL Intravenous PRN Evi Stallworth MD        lidocaine PF 1 % injection 1 mL  1 mL Intradermal Once PRN Evi Stallworth MD           Allergies: Allergies   Allergen Reactions    Adhesive Tape     Morphine        Problem List:  There is no problem list on file for this patient. Past Medical History:        Diagnosis Date    Anxiety     Depression     HPV in female     Migraine     Narcolepsy        Past Surgical History:  History reviewed. No pertinent surgical history.     Social History:    Social History     Tobacco Use    Smoking status: Former Smoker    Smokeless tobacco: Never Used   Substance Use Topics    Alcohol use: Yes     Comment: rare                                Counseling given: Not Answered      Vital Signs (Current):   Vitals:    08/21/20 1008   BP: 101/70   Pulse: 92   Resp: 16   Temp: 96.8 °F (36 °C)   TempSrc: Temporal   SpO2: 100%   Weight: 111 lb 12.8 oz (50.7 kg)   Height: 5' 1\" (1.549 m)                                              BP Readings from Last 3 Encounters:   08/21/20 101/70       NPO Status: Time of last liquid consumption: 0600(bowel prep)                        Time of last solid consumption: 2100                        Date of last liquid consumption: 08/21/20                        Date of last solid food consumption: 08/19/20    BMI:   Wt Readings from Last 3 Encounters:   08/21/20 111 lb 12.8 oz (50.7 kg)     Body mass index is 21.12 kg/m². CBC: No results found for: WBC, RBC, HGB, HCT, MCV, RDW, PLT    CMP: No results found for: NA, K, CL, CO2, BUN, CREATININE, GFRAA, AGRATIO, LABGLOM, GLUCOSE, PROT, CALCIUM, BILITOT, ALKPHOS, AST, ALT    POC Tests: No results for input(s): POCGLU, POCNA, POCK, POCCL, POCBUN, POCHEMO, POCHCT in the last 72 hours. Coags: No results found for: PROTIME, INR, APTT    HCG (If Applicable): No results found for: PREGTESTUR, PREGSERUM, HCG, HCGQUANT     ABGs: No results found for: PHART, PO2ART, BBZ7IHU, AMW2AWT, BEART, T2UKJVHZ     Type & Screen (If Applicable):  No results found for: LABABO, LABRH    Drug/Infectious Status (If Applicable):  No results found for: HIV, HEPCAB    COVID-19 Screening (If Applicable):   Lab Results   Component Value Date    COVID19 NOT DETECTED 08/17/2020         Anesthesia Evaluation  Patient summary reviewed no history of anesthetic complications:   Airway: Mallampati: II  TM distance: >3 FB   Neck ROM: full  Mouth opening: > = 3 FB Dental: normal exam         Pulmonary:                              Cardiovascular:Negative CV ROS                      Neuro/Psych:   (+) headaches: migraine headaches, depression/anxiety              ROS comment: narcolepsy  GI/Hepatic/Renal:            ROS comment: Recent abdominal pain. Endo/Other: Negative Endo/Other ROS                    Abdominal:           Vascular:                                        Anesthesia Plan      MAC     ASA 2       Induction: intravenous. Anesthetic plan and risks discussed with patient.                       Jasmyn Zhou MD   8/21/2020

## 2020-08-21 NOTE — PROGRESS NOTES
Ambulatory Surgery/Procedure Discharge Note    Vitals:    08/21/20 1440   BP: 98/62   Pulse: 78   Resp: 15   Temp: 97.3 °F (36.3 °C)   SpO2: 99%       No intake/output data recorded. Restroom use offered before discharge. Yes    Pain assessment:  none  Pain Level: 0        Patient discharged to home/self care.  Patient discharged via wheel chair by transporter to waiting family/S.O.       8/21/2020 3:04 PM

## 2020-08-22 NOTE — OP NOTE
[facility] [address], [address]                                OPERATIVE REPORT    PATIENT NAME: Terrell Chase                        :        1985  MED REC NO:   [id number]                          ROOM:  ACCOUNT NO:   [de-identified]                           ADMIT DATE: 2020  PROVIDER:     Nayla Domínguez MD    DATE OF PROCEDURE:  2020    SURGEON:  Nayla Domínguez MD    INDICATIONS:  This 41-year-old pleasant woman who presented with  persistent abdominal pain, dyspepsia, weight loss, unexplained  constipation, and family history of polyp. EGD:  With the patient in the left lateral position and after IV  Diprivan, the Olympus video endoscope was introduced into the esophagus  and advanced toward the gastroesophageal junction. The esophagus was  normal.  Stomach was carefully inspected, it was normal.  Biopsies were  obtained for Helicobacter pylori. The duodenum was normal.  The scope  was then removed without complication. COLONOSCOPY:  The Olympus video colonoscope was then inserted into the  rectum and carefully advanced to cecum. Careful inspection revealed no  sign of inflammatory bowel disease, carcinoma, polyp, or diverticulosis. Scope was then removed without complications. IMPRESSION:  1. Normal esophagogastroduodenoscopy.   2.  Normal colonoscopy.    ebl none        Effie Constantino MD    D: 2020 14:29:54       T: 2020 19:13:02     [email or web address]  Job#: [id number]     Doc#: [id number]    CC:  Nayla Domínguez MD

## 2022-04-17 ENCOUNTER — HOSPITAL ENCOUNTER (EMERGENCY)
Age: 37
Discharge: HOME | End: 2022-04-17
Attending: EMERGENCY MEDICINE
Payer: COMMERCIAL

## 2022-04-17 ENCOUNTER — APPOINTMENT (OUTPATIENT)
Dept: CT IMAGING | Age: 37
End: 2022-04-17
Payer: COMMERCIAL

## 2022-04-17 VITALS
BODY MASS INDEX: 20.62 KG/M2 | DIASTOLIC BLOOD PRESSURE: 75 MMHG | RESPIRATION RATE: 16 BRPM | HEIGHT: 61 IN | SYSTOLIC BLOOD PRESSURE: 119 MMHG | HEART RATE: 90 BPM | TEMPERATURE: 98.8 F | OXYGEN SATURATION: 100 % | WEIGHT: 109.19 LBS

## 2022-04-17 DIAGNOSIS — R10.84 GENERALIZED ABDOMINAL PAIN: Primary | ICD-10-CM

## 2022-04-17 LAB
A/G RATIO: 1.8 (ref 1.1–2.2)
ALBUMIN SERPL-MCNC: 4.5 G/DL (ref 3.4–5)
ALP BLD-CCNC: 47 U/L (ref 40–129)
ALT SERPL-CCNC: 17 U/L (ref 10–40)
ANION GAP SERPL CALCULATED.3IONS-SCNC: 11 MMOL/L (ref 3–16)
AST SERPL-CCNC: 16 U/L (ref 15–37)
BASOPHILS ABSOLUTE: 0.1 K/UL (ref 0–0.2)
BASOPHILS RELATIVE PERCENT: 1.1 %
BILIRUB SERPL-MCNC: 0.3 MG/DL (ref 0–1)
BILIRUBIN URINE: NEGATIVE
BLOOD, URINE: NEGATIVE
BUN BLDV-MCNC: 19 MG/DL (ref 7–20)
CALCIUM SERPL-MCNC: 9.3 MG/DL (ref 8.3–10.6)
CHLORIDE BLD-SCNC: 105 MMOL/L (ref 99–110)
CLARITY: CLEAR
CO2: 23 MMOL/L (ref 21–32)
COLOR: YELLOW
CREAT SERPL-MCNC: <0.5 MG/DL (ref 0.6–1.1)
EOSINOPHILS ABSOLUTE: 0.1 K/UL (ref 0–0.6)
EOSINOPHILS RELATIVE PERCENT: 1 %
GFR AFRICAN AMERICAN: >60
GFR NON-AFRICAN AMERICAN: >60
GLUCOSE BLD-MCNC: 91 MG/DL (ref 70–99)
GLUCOSE URINE: NEGATIVE MG/DL
HCG(URINE) PREGNANCY TEST: NEGATIVE
HCT VFR BLD CALC: 38.1 % (ref 36–48)
HEMOGLOBIN: 12.6 G/DL (ref 12–16)
KETONES, URINE: NEGATIVE MG/DL
LEUKOCYTE ESTERASE, URINE: NEGATIVE
LIPASE: 23 U/L (ref 13–60)
LYMPHOCYTES ABSOLUTE: 2 K/UL (ref 1–5.1)
LYMPHOCYTES RELATIVE PERCENT: 25.3 %
MCH RBC QN AUTO: 30.8 PG (ref 26–34)
MCHC RBC AUTO-ENTMCNC: 33.1 G/DL (ref 31–36)
MCV RBC AUTO: 93.2 FL (ref 80–100)
MICROSCOPIC EXAMINATION: NORMAL
MONOCYTES ABSOLUTE: 0.7 K/UL (ref 0–1.3)
MONOCYTES RELATIVE PERCENT: 8.2 %
NEUTROPHILS ABSOLUTE: 5.2 K/UL (ref 1.7–7.7)
NEUTROPHILS RELATIVE PERCENT: 64.4 %
NITRITE, URINE: NEGATIVE
PDW BLD-RTO: 12.3 % (ref 12.4–15.4)
PH UA: 6.5 (ref 5–8)
PLATELET # BLD: 223 K/UL (ref 135–450)
PMV BLD AUTO: 9.8 FL (ref 5–10.5)
POTASSIUM REFLEX MAGNESIUM: 4 MMOL/L (ref 3.5–5.1)
PROTEIN UA: NEGATIVE MG/DL
RBC # BLD: 4.09 M/UL (ref 4–5.2)
SODIUM BLD-SCNC: 139 MMOL/L (ref 136–145)
SPECIFIC GRAVITY UA: 1.01 (ref 1–1.03)
TOTAL PROTEIN: 7 G/DL (ref 6.4–8.2)
URINE REFLEX TO CULTURE: NORMAL
URINE TYPE: NORMAL
UROBILINOGEN, URINE: 0.2 E.U./DL
WBC # BLD: 8 K/UL (ref 4–11)

## 2022-04-17 PROCEDURE — 74177 CT ABD & PELVIS W/CONTRAST: CPT

## 2022-04-17 PROCEDURE — 99283 EMERGENCY DEPT VISIT LOW MDM: CPT

## 2022-04-17 PROCEDURE — 6360000004 HC RX CONTRAST MEDICATION: Performed by: EMERGENCY MEDICINE

## 2022-04-17 PROCEDURE — 2580000003 HC RX 258: Performed by: EMERGENCY MEDICINE

## 2022-04-17 PROCEDURE — 6360000002 HC RX W HCPCS: Performed by: EMERGENCY MEDICINE

## 2022-04-17 PROCEDURE — 83690 ASSAY OF LIPASE: CPT

## 2022-04-17 PROCEDURE — 80053 COMPREHEN METABOLIC PANEL: CPT

## 2022-04-17 PROCEDURE — 96374 THER/PROPH/DIAG INJ IV PUSH: CPT

## 2022-04-17 PROCEDURE — 84703 CHORIONIC GONADOTROPIN ASSAY: CPT

## 2022-04-17 PROCEDURE — 81003 URINALYSIS AUTO W/O SCOPE: CPT

## 2022-04-17 PROCEDURE — 6370000000 HC RX 637 (ALT 250 FOR IP): Performed by: EMERGENCY MEDICINE

## 2022-04-17 PROCEDURE — 85025 COMPLETE CBC W/AUTO DIFF WBC: CPT

## 2022-04-17 RX ORDER — ONDANSETRON 4 MG/1
4 TABLET, ORALLY DISINTEGRATING ORAL EVERY 8 HOURS PRN
Qty: 20 TABLET | Refills: 0 | Status: SHIPPED | OUTPATIENT
Start: 2022-04-17

## 2022-04-17 RX ORDER — PROPRANOLOL HYDROCHLORIDE 10 MG/1
TABLET ORAL
COMMUNITY
Start: 2021-03-29

## 2022-04-17 RX ORDER — (CALCIUM, MAGNESIUM, POTASSIUM, AND SODIUM OXYBATES) .5; .5; .5; .5 G/ML; G/ML; G/ML; G/ML
SOLUTION ORAL
COMMUNITY

## 2022-04-17 RX ORDER — DICYCLOMINE HYDROCHLORIDE 10 MG/1
10 CAPSULE ORAL 3 TIMES DAILY PRN
Qty: 30 CAPSULE | Refills: 0 | Status: SHIPPED | OUTPATIENT
Start: 2022-04-17

## 2022-04-17 RX ORDER — DICYCLOMINE HYDROCHLORIDE 10 MG/1
10 CAPSULE ORAL ONCE
Status: COMPLETED | OUTPATIENT
Start: 2022-04-17 | End: 2022-04-17

## 2022-04-17 RX ORDER — SODIUM CHLORIDE, SODIUM LACTATE, POTASSIUM CHLORIDE, AND CALCIUM CHLORIDE .6; .31; .03; .02 G/100ML; G/100ML; G/100ML; G/100ML
1000 INJECTION, SOLUTION INTRAVENOUS ONCE
Status: COMPLETED | OUTPATIENT
Start: 2022-04-17 | End: 2022-04-17

## 2022-04-17 RX ORDER — ONDANSETRON 2 MG/ML
4 INJECTION INTRAMUSCULAR; INTRAVENOUS
Status: DISCONTINUED | OUTPATIENT
Start: 2022-04-17 | End: 2022-04-17 | Stop reason: HOSPADM

## 2022-04-17 RX ORDER — GABAPENTIN 100 MG/1
CAPSULE ORAL
COMMUNITY
Start: 2022-03-15

## 2022-04-17 RX ADMIN — ONDANSETRON 4 MG: 2 INJECTION INTRAMUSCULAR; INTRAVENOUS at 15:36

## 2022-04-17 RX ADMIN — SODIUM CHLORIDE, POTASSIUM CHLORIDE, SODIUM LACTATE AND CALCIUM CHLORIDE 1000 ML: 600; 310; 30; 20 INJECTION, SOLUTION INTRAVENOUS at 15:37

## 2022-04-17 RX ADMIN — DICYCLOMINE HYDROCHLORIDE 10 MG: 10 CAPSULE ORAL at 15:37

## 2022-04-17 RX ADMIN — IOPAMIDOL 80 ML: 755 INJECTION, SOLUTION INTRAVENOUS at 16:20

## 2022-04-17 NOTE — ED NOTES
Patient discharged to home via family. Written discharge instructions reviewed with understanding. Copy of AVS and signed prescription sent home with patient. Patient able to walk from ED without assistance.        Adriane Rebollar RN  04/17/22 3842

## 2022-04-17 NOTE — ED PROVIDER NOTES
Comment: rare    Drug use: Never    Sexual activity: Not on file   Other Topics Concern    Not on file   Social History Narrative    Not on file     Social Determinants of Health     Financial Resource Strain:     Difficulty of Paying Living Expenses: Not on file   Food Insecurity:     Worried About Running Out of Food in the Last Year: Not on file    Vladimir of Food in the Last Year: Not on file   Transportation Needs:     Lack of Transportation (Medical): Not on file    Lack of Transportation (Non-Medical):  Not on file   Physical Activity:     Days of Exercise per Week: Not on file    Minutes of Exercise per Session: Not on file   Stress:     Feeling of Stress : Not on file   Social Connections:     Frequency of Communication with Friends and Family: Not on file    Frequency of Social Gatherings with Friends and Family: Not on file    Attends Church Services: Not on file    Active Member of [address] [place] [address] [employer] or Organizations: Not on file    Attends Club or Organization Meetings: Not on file    Marital Status: Not on file   Intimate Partner Violence:     Fear of Current or Ex-Partner: Not on file    Emotionally Abused: Not on file    Physically Abused: Not on file    Sexually Abused: Not on file   Housing Stability:     Unable to Pay for Housing in the Last Year: Not on file    Number of [place] in the Last Year: Not on file    Unstable Housing in the Last Year: Not on file     Current Facility-Administered Medications   Medication Dose Route Frequency Provider Last Rate Last Admin    ondansetron (ZOFRAN) injection 4 mg  4 mg IntraVENous Q1H PRN Sagrario Figueroa MD   4 mg at 04/17/22 1536     Current Outpatient Medications   Medication Sig Dispense Refill    propranolol (INDERAL) 10 MG tablet propranolol 10 mg tablet      ondansetron (ZOFRAN ODT) 4 MG disintegrating tablet Take 1 tablet by mouth every 8 hours as needed for Nausea or Vomiting 20 tablet 0    dicyclomine (BENTYL) 10 MG capsule Take 1 capsule by mouth 3 times daily as needed (bowel spasms) 30 capsule 0    levonorgestrel (MIRENA) IUD 52 mg by IntraUTERine route      gabapentin (NEURONTIN) 100 MG capsule TAKE 1 CAPSULE BY MOUTH EVERY DAY      Ca, Mg, K, and Na Oxybates (XYWAV) 500 MG/ML SOLN Xywav 0.5 gram/mL oral solution      buPROPion HCl ER, XL, 450 MG TB24 Take 450 mg by mouth daily      lamoTRIgine (LAMICTAL) 200 MG tablet Take 200 mg by mouth daily (Patient not taking: Reported on 4/17/2022)      ARIPiprazole (ABILIFY) 10 MG tablet Take 10 mg by mouth daily (Patient not taking: Reported on 4/17/2022)      Lisdexamfetamine Dimesylate (VYVANSE) 60 MG CAPS Take 1 capsule by mouth daily.  Galcanezumab-gnlm (EMGALITY) 120 MG/ML SOAJ Inject into the skin every 30 days (Patient not taking: Reported on 4/17/2022)      onabotulinumtoxin A (BOTOX) 100 units injection Inject 100 Units into the muscle every 3 months (Patient not taking: Reported on 4/17/2022)      amphetamine-dextroamphetamine (ADDERALL) 20 MG tablet Take 20 mg by mouth 3 times daily as needed.  (Patient not taking: Reported on 4/17/2022)      ondansetron (ZOFRAN) 4 MG tablet Take 4 mg by mouth every 8 hours as needed for Nausea or Vomiting      hydrOXYzine (ATARAX) 25 MG tablet Take 25-50 mg by mouth nightly as needed for Itching (Patient not taking: Reported on 4/17/2022)      Ubrogepant (UBRELVY) 100 MG TABS Take 1-2 tablets by mouth daily as needed (Patient not taking: Reported on 4/17/2022)      Dextrose-Fructose-Sod Citrate (NAUZENE) 161-104-373 MG CHEW Take 2-4 tablets by mouth daily as needed      ibuprofen (ADVIL;MOTRIN) 200 MG tablet Take 200 mg by mouth every 6 hours as needed for Pain      vitamin B-12 (CYANOCOBALAMIN) 1000 MCG tablet Take 1,000 mcg by mouth daily      Cholecalciferol (VITAMIN D3) 50 MCG (2000 UT) CAPS Take 1 capsule by mouth daily      Biotin 5000 MCG TABS Take 1 tablet by mouth daily      Multiple Vitamins-Minerals (WOMENS MULTI) CAPS Take 1 capsule by mouth daily      calcium carbonate (TUMS EXTRA STRENGTH 750) 750 MG chewable tablet Take 2 tablets by mouth daily      magnesium oxide (MAG-OX) 400 MG tablet Take 400 mg by mouth daily       Allergies   Allergen Reactions    Adhesive Tape     Morphine        REVIEW OF SYSTEMS  10 systems reviewed, pertinent positives per HPI otherwise noted to be negative. PHYSICAL EXAM  /75   Pulse 90   Temp 98.8 °F (37.1 °C) (Oral)   Resp 16   Ht 5' 1\" (1.549 m)   Wt 109 lb 3 oz (49.5 kg)   LMP 03/06/2022 (Approximate)   SpO2 100%   Breastfeeding No   BMI 20.63 kg/m²    GENERAL APPEARANCE: Awake and alert. Cooperative. No distress. HENT: Normocephalic. Atraumatic. Mucous membranes are moist.  NECK: Supple. EYES: PERRL. EOM's grossly intact. HEART/CHEST: RRR. No murmurs. No chest wall tenderness. LUNGS: Respirations unlabored. CTAB. Good air exchange. Speaking comfortably in full sentences. ABDOMEN: Moderate diffuse and nonfocal tenderness. Soft. Non-distended. No masses. No organomegaly. No guarding or rebound. Normal bowel sounds throughout. MUSCULOSKELETAL: No extremity edema. Compartments soft. No deformity. No tenderness in the extremities. All extremities neurovascularly intact. SKIN: Warm and dry. No acute rashes. NEUROLOGICAL: Alert and oriented. CN's 2-12 intact. No gross facial drooping. Strength 5/5, sensation intact. 2 plus DTR's in knees bilaterally. Gait normal.  PSYCHIATRIC: Normal mood and affect. LABS  I have reviewed all labs for this visit.    Results for orders placed or performed during the hospital encounter of 04/17/22   Urinalysis with Reflex to Culture    Specimen: Urine, clean catch   Result Value Ref Range    Color, UA Yellow Straw/Yellow    Clarity, UA Clear Clear    Glucose, Ur Negative Negative mg/dL    Bilirubin Urine Negative Negative    Ketones, Urine Negative Negative mg/dL    Specific Gravity, UA 1.010 1.005 - 1.030 Blood, Urine Negative Negative    pH, UA 6.5 5.0 - 8.0    Protein, UA Negative Negative mg/dL    Urobilinogen, Urine 0.2 <2.0 E.U./dL    Nitrite, Urine Negative Negative    Leukocyte Esterase, Urine Negative Negative    Microscopic Examination Not Indicated     Urine Type NotGiven     Urine Reflex to Culture Not Indicated    Pregnancy, Urine   Result Value Ref Range    HCG(Urine) Pregnancy Test Negative Detects HCG level >20 MIU/mL   CBC with Auto Differential   Result Value Ref Range    WBC 8.0 4.0 - 11.0 K/uL    RBC 4.09 4.00 - 5.20 M/uL    Hemoglobin 12.6 12.0 - 16.0 g/dL    Hematocrit 38.1 36.0 - 48.0 %    MCV 93.2 80.0 - 100.0 fL    MCH 30.8 26.0 - 34.0 pg    MCHC 33.1 31.0 - 36.0 g/dL    RDW 12.3 (L) 12.4 - 15.4 %    Platelets 570 132 - 110 K/uL    MPV 9.8 5.0 - 10.5 fL    Neutrophils % 64.4 %    Lymphocytes % 25.3 %    Monocytes % 8.2 %    Eosinophils % 1.0 %    Basophils % 1.1 %    Neutrophils Absolute 5.2 1.7 - 7.7 K/uL    Lymphocytes Absolute 2.0 1.0 - 5.1 K/uL    Monocytes Absolute 0.7 0.0 - 1.3 K/uL    Eosinophils Absolute 0.1 0.0 - 0.6 K/uL    Basophils Absolute 0.1 0.0 - 0.2 K/uL   Comprehensive Metabolic Panel w/ Reflex to MG   Result Value Ref Range    Sodium 139 136 - 145 mmol/L    Potassium reflex Magnesium 4.0 3.5 - 5.1 mmol/L    Chloride 105 99 - 110 mmol/L    CO2 23 21 - 32 mmol/L    Anion Gap 11 3 - 16    Glucose 91 70 - 99 mg/dL    BUN 19 7 - 20 mg/dL    CREATININE <0.5 (L) 0.6 - 1.1 mg/dL    GFR Non-African American >60 >60    GFR African American >60 >60    Calcium 9.3 8.3 - 10.6 mg/dL    Total Protein 7.0 6.4 - 8.2 g/dL    Albumin 4.5 3.4 - 5.0 g/dL    Albumin/Globulin Ratio 1.8 1.1 - 2.2    Total Bilirubin 0.3 0.0 - 1.0 mg/dL    Alkaline Phosphatase 47 40 - 129 U/L    ALT 17 10 - 40 U/L    AST 16 15 - 37 U/L   Lipase   Result Value Ref Range    Lipase 23.0 13.0 - 60.0 U/L       RADIOLOGY    CT ABDOMEN PELVIS W IV CONTRAST Additional Contrast? None    Result Date: 4/17/2022  EXAM: CT ABDOMEN AND PELVIS WITHOUT CONTRAST INDICATION: Abdominal pain, nausea, constipation COMPARISON: None TECHNIQUE: CT abdomen pelvis was performed without contrast according to standard protocol. Axial images and multiplanar reformatted images were reviewed. Up-to-date CT equipment and radiation dose reduction techniques were employed. IV Contrast: None Oral Contrast: No. FINDINGS: The visualized lung bases are clear. The heart size is normal without pericardial effusion. Noncontrast images of the liver appear normal without biliary ductal dilatation. The gallbladder appears normal. Noncontrast images of the spleen, pancreas, and adrenal glands appear normal. No stones are identified in either kidney or along the course of either ureter and no hydronephrosis or hydroureter is seen. The urinary bladder appears normal. The uterus contains an intrauterine device. There is a 3.3 cm right ovarian cyst. The stomach, small and large bowel are normal in caliber without indication of obstruction. There is no significant retained stool in the colon. There is no evidence of appendicitis. No free intraperitoneal air is seen. No lymphadenopathy is seen. The abdominal aorta is normal in course and caliber. Bone windows demonstrate no suspicious lytic or blastic lesions. 1.  No acute abnormality on noncontrast CT of the abdomen and pelvis. 2.  3.3 cm right ovarian cyst, within normal limits in size. ED COURSE/MDM  Patient seen and evaluated. Old records reviewed. Labs and imaging reviewed nd results discussed with patient. After initial evaluation, differential diagnostic considerations included: kidney stone, pyelonephritis, UTI, appendicitis, bowel obstruction, diverticulitis, hernia, gastritis/gastroenteritis, pancreatitis, cholecystitis, hepatitis, constipation, IBS, IBD    The patient's ED workup was notable for abdominal pain since last night, persistent today.   Urinalysis without UTI, pregnancy test negative, no leukocytosis, no metabolic derangement, LFTs and lipase all normal.  CAT scan shows R ovarian cyst (likely incidental, not clinically focally ttp here to suggest it is pathologic), otherwise no acute findings. She was given Zofran Bentyl and fluids and on reassessment is feeling a lot better. D/c with sx management meds and close PCP f/u. During the patient's ED course, the patient was given:  Medications   ondansetron (ZOFRAN) injection 4 mg (4 mg IntraVENous Given 4/17/22 1536)   dicyclomine (BENTYL) capsule 10 mg (10 mg Oral Given 4/17/22 1537)   lactated ringers bolus (1,000 mLs IntraVENous New Bag 4/17/22 1537)   iopamidol (ISOVUE-370) 76 % injection 80 mL (80 mLs IntraVENous Given 4/17/22 1620)        CLINICAL IMPRESSION  1. Generalized abdominal pain        Blood pressure 119/75, pulse 90, temperature 98.8 °F (37.1 °C), temperature source Oral, resp. rate 16, height 5' 1\" (1.549 m), weight 109 lb 3 oz (49.5 kg), last menstrual period 03/06/2022, SpO2 100 %, not currently breastfeeding. DISPOSITION  Patience Asim was discharged to home in stable condition. I have discussed the findings of today's workup with the patient and addressed the patient's questions and concerns. Important warning signs as well as new or worsening symptoms which would necessitate immediate return to the ED were discussed. The plan is to discharge from the ED at this time, and the patient is in stable condition. The patient acknowledged understanding is agreeable with this plan. Patient was given scripts for the following medications. I counseled patient how to take these medications.    New Prescriptions    DICYCLOMINE (BENTYL) 10 MG CAPSULE    Take 1 capsule by mouth 3 times daily as needed (bowel spasms)    ONDANSETRON (ZOFRAN ODT) 4 MG DISINTEGRATING TABLET    Take 1 tablet by mouth every 8 hours as needed for Nausea or Vomiting       Follow-up with:  [address]isa [id number] [place] [address]  [phone]    Schedule an appointment as soon as possible for a visit in 2 days  For symptom re-evaluation    [facility] [address]  [phone]  Go to   If symptoms worsen      DISCLAIMER: This chart was created using Dragon dictation software. Efforts were made by me to ensure accuracy, however some errors may be present due to limitations of this technology and occasionally words are not transcribed correctly.         Shahnaz Woodall MD  04/17/22 0581

## (undated) DEVICE — FORCEPS BX L240CM JAW DIA2.4MM ORNG L CAP W/ NDL DISP RAD